# Patient Record
Sex: FEMALE | Race: WHITE | NOT HISPANIC OR LATINO | URBAN - METROPOLITAN AREA
[De-identification: names, ages, dates, MRNs, and addresses within clinical notes are randomized per-mention and may not be internally consistent; named-entity substitution may affect disease eponyms.]

---

## 2020-02-13 ENCOUNTER — OFFICE (OUTPATIENT)
Dept: URBAN - METROPOLITAN AREA CLINIC 78 | Facility: CLINIC | Age: 80
End: 2020-02-13
Payer: OTHER GOVERNMENT

## 2020-02-13 VITALS
SYSTOLIC BLOOD PRESSURE: 123 MMHG | TEMPERATURE: 97.3 F | WEIGHT: 197 LBS | HEIGHT: 62 IN | HEART RATE: 86 BPM | DIASTOLIC BLOOD PRESSURE: 86 MMHG

## 2020-02-13 DIAGNOSIS — R13.19 OTHER DYSPHAGIA: ICD-10-CM

## 2020-02-13 PROCEDURE — 99203 OFFICE O/P NEW LOW 30 MIN: CPT

## 2020-02-13 NOTE — SERVICEHPINOTES
HENNY WHALEN   is a   79   year old    female who is being seen in consultation at the request of   ENEIDA COVINGTON   for dysphagia. She has h/o Nissen fundoplication in 1997 due to large hiatal hernia and reflux. She has had dysphagia issues in the past and reports 2 prior dilations, last done in 2007. She reports that the dilations were very helpful. She started having more dysphagia last summer and fall. Pills seem to sit in throat and when she eats (solid foods) she has to be careful. Can get sensation of bubble caught in throat at times too. She is on omeprazole 40 mg. She is wanting to have another dilation.  Patient has PMH a fib (on Xarelto), GERD, pre-diabetes, asthma/emphysema. No h/o CAD or CHF. Feels well overall and has no other concerns today.

## 2020-11-03 ENCOUNTER — ON CAMPUS - OUTPATIENT (OUTPATIENT)
Dept: URBAN - METROPOLITAN AREA HOSPITAL 65 | Facility: HOSPITAL | Age: 80
End: 2020-11-03
Payer: OTHER GOVERNMENT

## 2020-11-03 DIAGNOSIS — K29.60 OTHER GASTRITIS WITHOUT BLEEDING: ICD-10-CM

## 2020-11-03 DIAGNOSIS — R13.10 DYSPHAGIA, UNSPECIFIED: ICD-10-CM

## 2020-11-03 DIAGNOSIS — K22.2 ESOPHAGEAL OBSTRUCTION: ICD-10-CM

## 2020-11-03 PROCEDURE — 43249 ESOPH EGD DILATION <30 MM: CPT | Performed by: INTERNAL MEDICINE

## 2020-11-03 PROCEDURE — 43239 EGD BIOPSY SINGLE/MULTIPLE: CPT | Performed by: INTERNAL MEDICINE

## 2021-08-25 ENCOUNTER — OFFICE (OUTPATIENT)
Dept: URBAN - METROPOLITAN AREA CLINIC 79 | Facility: CLINIC | Age: 81
End: 2021-08-25
Payer: OTHER GOVERNMENT

## 2021-08-25 VITALS
DIASTOLIC BLOOD PRESSURE: 82 MMHG | SYSTOLIC BLOOD PRESSURE: 146 MMHG | WEIGHT: 200 LBS | HEIGHT: 62 IN | HEART RATE: 98 BPM | TEMPERATURE: 96.6 F

## 2021-08-25 DIAGNOSIS — R13.12 DYSPHAGIA, OROPHARYNGEAL PHASE: ICD-10-CM

## 2021-08-25 DIAGNOSIS — R13.19 OTHER DYSPHAGIA: ICD-10-CM

## 2021-08-25 DIAGNOSIS — Z98.890 OTHER SPECIFIED POSTPROCEDURAL STATES: ICD-10-CM

## 2021-08-25 PROCEDURE — 99214 OFFICE O/P EST MOD 30 MIN: CPT | Performed by: PHYSICIAN ASSISTANT

## 2021-08-25 NOTE — SERVICEHPINOTES
80 yo female presents with swallowing problems. She has h/o Nissen fundoplication in 1997 due to large hiatal hernia and reflux. She had an EGD with dilation of lower esophageal stricture in November 2020 for solid food dysphagia (also had dilations in remote past - 2007), but now she reports that she had 2 episodes of her "throat closing" when taking a drink of soda - happened 2 days in a row in July. States that the liquid went down but she felt like she couldn't breathe. She did have some coughing the second time. Denies solid food dysphagia but notes food seems slow to go down at times. She notes intermittent feeling of lump in her throat for several years. She is on omeprazole 20 mg BID. Patient has PMH A fib (on Xarelto), GERD, diabetes, asthma/emphysema. No h/o CAD or CHF. Feels well overall and has no other concerns today.